# Patient Record
Sex: MALE | Race: WHITE | Employment: FULL TIME | URBAN - METROPOLITAN AREA
[De-identification: names, ages, dates, MRNs, and addresses within clinical notes are randomized per-mention and may not be internally consistent; named-entity substitution may affect disease eponyms.]

---

## 2018-12-07 ENCOUNTER — TELEPHONE (OUTPATIENT)
Dept: OTHER | Facility: CLINIC | Age: 38
End: 2018-12-07

## 2018-12-07 NOTE — TELEPHONE ENCOUNTER
12/7/2018    Call Regarding Onboarding [employer] other     Attempt 1    Message on voicemail    Comments: 1 child dep       Outreach   Francine Lozano

## 2018-12-18 NOTE — TELEPHONE ENCOUNTER
12/18/2018    Call Regarding Onboarding [employer] other     Attempt 2    Message on voicemail    Comments:       Outreach   Francine Lozano

## 2018-12-27 NOTE — TELEPHONE ENCOUNTER
12/27/2018    Call Regarding Onboarding [employer]    Attempt 3    Message PATIENT BUSY    Comments:       Outreach   CWR

## 2019-11-18 ENCOUNTER — ALLIED HEALTH/NURSE VISIT (OUTPATIENT)
Dept: FAMILY MEDICINE | Facility: CLINIC | Age: 39
End: 2019-11-18
Payer: COMMERCIAL

## 2019-11-18 DIAGNOSIS — Z00.00 PREVENTATIVE HEALTH CARE: Primary | ICD-10-CM

## 2019-11-18 PROCEDURE — 90632 HEPA VACCINE ADULT IM: CPT

## 2019-11-18 PROCEDURE — 90472 IMMUNIZATION ADMIN EACH ADD: CPT

## 2019-11-18 PROCEDURE — 90471 IMMUNIZATION ADMIN: CPT

## 2019-11-18 PROCEDURE — 90746 HEPB VACCINE 3 DOSE ADULT IM: CPT

## 2019-11-18 PROCEDURE — 99207 ZZC NO CHARGE NURSE ONLY: CPT

## 2019-11-18 PROCEDURE — 90715 TDAP VACCINE 7 YRS/> IM: CPT

## 2020-01-20 ENCOUNTER — ALLIED HEALTH/NURSE VISIT (OUTPATIENT)
Dept: FAMILY MEDICINE | Facility: CLINIC | Age: 40
End: 2020-01-20
Payer: COMMERCIAL

## 2020-01-20 DIAGNOSIS — Z00.00 PREVENTATIVE HEALTH CARE: Primary | ICD-10-CM

## 2020-01-20 PROCEDURE — 90746 HEPB VACCINE 3 DOSE ADULT IM: CPT

## 2020-01-20 PROCEDURE — 90471 IMMUNIZATION ADMIN: CPT

## 2020-01-20 PROCEDURE — 99207 ZZC NO CHARGE NURSE ONLY: CPT
